# Patient Record
Sex: FEMALE | Race: OTHER | HISPANIC OR LATINO | URBAN - METROPOLITAN AREA
[De-identification: names, ages, dates, MRNs, and addresses within clinical notes are randomized per-mention and may not be internally consistent; named-entity substitution may affect disease eponyms.]

---

## 2022-01-27 ENCOUNTER — EMERGENCY (EMERGENCY)
Facility: HOSPITAL | Age: 57
LOS: 1 days | Discharge: HOME | End: 2022-01-27
Attending: STUDENT IN AN ORGANIZED HEALTH CARE EDUCATION/TRAINING PROGRAM
Payer: COMMERCIAL

## 2022-01-27 VITALS
HEART RATE: 77 BPM | OXYGEN SATURATION: 98 % | SYSTOLIC BLOOD PRESSURE: 126 MMHG | WEIGHT: 149.91 LBS | DIASTOLIC BLOOD PRESSURE: 82 MMHG | RESPIRATION RATE: 18 BRPM | TEMPERATURE: 98 F

## 2022-01-27 PROCEDURE — 12011 RPR F/E/E/N/L/M 2.5 CM/<: CPT

## 2022-01-27 PROCEDURE — 70450 CT HEAD/BRAIN W/O DYE: CPT | Mod: 26,MA

## 2022-01-27 PROCEDURE — 70450 CT HEAD/BRAIN W/O DYE: CPT | Mod: MA

## 2022-01-27 PROCEDURE — 70486 CT MAXILLOFACIAL W/O DYE: CPT | Mod: 26,MA

## 2022-01-27 PROCEDURE — 99284 EMERGENCY DEPT VISIT MOD MDM: CPT | Mod: 25

## 2022-01-27 PROCEDURE — 90471 IMMUNIZATION ADMIN: CPT | Mod: XU

## 2022-01-27 PROCEDURE — 90715 TDAP VACCINE 7 YRS/> IM: CPT

## 2022-01-27 PROCEDURE — 99285 EMERGENCY DEPT VISIT HI MDM: CPT | Mod: 25

## 2022-01-27 PROCEDURE — 70486 CT MAXILLOFACIAL W/O DYE: CPT | Mod: MA

## 2022-01-27 RX ORDER — TETANUS TOXOID, REDUCED DIPHTHERIA TOXOID AND ACELLULAR PERTUSSIS VACCINE, ADSORBED 5; 2.5; 8; 8; 2.5 [IU]/.5ML; [IU]/.5ML; UG/.5ML; UG/.5ML; UG/.5ML
0.5 SUSPENSION INTRAMUSCULAR ONCE
Refills: 0 | Status: COMPLETED | OUTPATIENT
Start: 2022-01-27 | End: 2022-01-27

## 2022-01-27 RX ADMIN — TETANUS TOXOID, REDUCED DIPHTHERIA TOXOID AND ACELLULAR PERTUSSIS VACCINE, ADSORBED 0.5 MILLILITER(S): 5; 2.5; 8; 8; 2.5 SUSPENSION INTRAMUSCULAR at 10:29
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
## 2022-01-27 NOTE — ED PROVIDER NOTE - PATIENT PORTAL LINK FT
You can access the FollowMyHealth Patient Portal offered by [facility] by registering at the following website: [email or web address]. By joining [facility]’s FollowMyHealth portal, you will also be able to view your health information using other applications (apps) compatible with our system.
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
## 2022-01-27 NOTE — ED PROVIDER NOTE - PHYSICAL EXAMINATION
GENERAL: patient appears well, no acute distress, appropriate, pleasant  EYES: sclera clear, no exudates  ENMT: oropharynx clear without erythema, no exudates, +laceration on bridge of nose  NECK: supple, soft, no thyromegaly noted, +in C-spine collar   LUNGS: good air entry bilaterally, clear to auscultation, symmetric breath sounds, no wheezing, rhonchi or rales appreciated  HEART: S1/S2, regular rate and rhythm, no murmurs noted, no lower extremity edema  GASTROINTESTINAL: abdomen is soft, nontender, nondistended, normoactive bowel sounds, no palpable masses  INTEGUMENT: good skin turgor, +Lesion on nose   MUSCULOSKELETAL: no clubbing or cyanosis, no obvious deformity,  NEUROLOGIC: AAO x3, good muscle tone in 4 extremities, no obvious sensory deficits  PSYCHIATRIC: mood is good, affect is congruent, linear and logical thought process

## 2022-01-27 NOTE — ED PROVIDER NOTE - NSFOLLOWUPINSTRUCTIONS_ED_ALL_ED_FT
Follow up with your PCP in 24-48 hours.   Your sutures will fall out on their own.   May take Tylenol and Motrin as directed on the bottle for pain control.   Return to the ER if you develop any new or worsening symptoms such as fever, redness/swelling, pus drainage, chest pain, shortness of breath, numbness, weakness, abdominal pain, nausea, vomiting, or visual changes.

## 2022-01-27 NOTE — ED ADULT NURSE NOTE - NSIMPLEMENTINTERV_GEN_ALL_ED
Implemented All Fall Risk Interventions:  [place] to call system. Call bell, personal items and telephone within reach. Instruct patient to call for assistance. Room bathroom lighting operational. Non-slip footwear when patient is off stretcher. Physically safe environment: no spills, clutter or unnecessary equipment. Stretcher in lowest position, wheels locked, appropriate side rails in place. Provide visual cue, wrist band, yellow gown, etc. Monitor gait and stability. Monitor for mental status changes and reorient to person, place, and time. Review medications for side effects contributing to fall risk. Reinforce activity limits and safety measures with patient and family.
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
## 2022-01-27 NOTE — ED ADULT NURSE NOTE - PAIN: BODY LOCATION
Detail Level: Simple Other (Free Text): samples of Retin A 0.08% was gave to patient to use daily for 3 months. May use vaseline if gets irritated. Note Text (......Xxx Chief Complaint.): This diagnosis correlates with the Other (Free Text): advised patient we are not doing elective procedures. gave a sample of retina to try to exfoliate off but not STD or cancerous. advised to rtc in 3 months if needed for treatment headache
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
## 2024-03-06 ENCOUNTER — RX ONLY (RX ONLY)
Age: 59
End: 2024-03-06

## 2024-03-06 ENCOUNTER — OFFICE (OUTPATIENT)
Dept: URBAN - METROPOLITAN AREA CLINIC 28 | Facility: CLINIC | Age: 59
Setting detail: OPHTHALMOLOGY
End: 2024-03-06
Payer: COMMERCIAL

## 2024-03-06 DIAGNOSIS — H25.13: ICD-10-CM

## 2024-03-06 DIAGNOSIS — H10.45: ICD-10-CM

## 2024-03-06 PROCEDURE — 92004 COMPRE OPH EXAM NEW PT 1/>: CPT | Performed by: OPHTHALMOLOGY

## 2024-03-06 ASSESSMENT — REFRACTION_CURRENTRX
OD_AXIS: 180
OD_SPHERE: +1.00
OS_AXIS: 180
OS_ADD: +2.50
OD_ADD: +2.50
OS_SPHERE: +1.00
OS_OVR_VA: 20/
OS_VPRISM_DIRECTION: PROGS
OS_CYLINDER: 0.00
OD_CYLINDER: 0.00
OD_VPRISM_DIRECTION: PROGS
OD_OVR_VA: 20/

## 2024-11-20 ENCOUNTER — OFFICE (OUTPATIENT)
Dept: URBAN - METROPOLITAN AREA CLINIC 28 | Facility: CLINIC | Age: 59
Setting detail: OPHTHALMOLOGY
End: 2024-11-20
Payer: COMMERCIAL

## 2024-11-20 DIAGNOSIS — H10.45: ICD-10-CM

## 2024-11-20 DIAGNOSIS — H25.13: ICD-10-CM

## 2024-11-20 PROCEDURE — 92012 INTRM OPH EXAM EST PATIENT: CPT | Performed by: OPHTHALMOLOGY

## 2024-11-20 ASSESSMENT — CONFRONTATIONAL VISUAL FIELD TEST (CVF)
OS_FINDINGS: FULL
OD_FINDINGS: FULL

## 2024-11-20 ASSESSMENT — REFRACTION_CURRENTRX
OS_SPHERE: +1.00
OD_VPRISM_DIRECTION: PROGS
OS_VPRISM_DIRECTION: PROGS
OS_OVR_VA: 20/
OD_CYLINDER: 0.00
OS_ADD: +2.50
OD_SPHERE: +1.00
OD_OVR_VA: 20/
OS_CYLINDER: 0.00
OD_ADD: +2.50
OD_AXIS: 180
OS_AXIS: 180

## 2024-11-20 ASSESSMENT — KERATOMETRY
OD_K1POWER_DIOPTERS: 43.25
OD_AXISANGLE_DEGREES: 088
OS_K2POWER_DIOPTERS: 44.00
OS_AXISANGLE_DEGREES: 089
OS_K1POWER_DIOPTERS: 43.00
OD_K2POWER_DIOPTERS: 45.00

## 2024-11-20 ASSESSMENT — REFRACTION_AUTOREFRACTION
OD_AXIS: 160
OS_CYLINDER: 0.00
OD_SPHERE: +1.50
OD_CYLINDER: -0.25
OS_SPHERE: +1.25
OS_AXIS: 000

## 2024-11-20 ASSESSMENT — VISUAL ACUITY
OS_BCVA: 20/20-1
OD_BCVA: 20/20

## 2024-11-20 ASSESSMENT — TONOMETRY: OS_IOP_MMHG: 15

## 2024-12-19 ENCOUNTER — OFFICE (OUTPATIENT)
Dept: URBAN - METROPOLITAN AREA CLINIC 28 | Facility: CLINIC | Age: 59
Setting detail: OPHTHALMOLOGY
End: 2024-12-19
Payer: COMMERCIAL

## 2024-12-19 DIAGNOSIS — H01.001: ICD-10-CM

## 2024-12-19 DIAGNOSIS — H10.45: ICD-10-CM

## 2024-12-19 DIAGNOSIS — H25.13: ICD-10-CM

## 2024-12-19 DIAGNOSIS — H01.004: ICD-10-CM

## 2024-12-19 PROCEDURE — 92012 INTRM OPH EXAM EST PATIENT: CPT | Performed by: OPHTHALMOLOGY

## 2024-12-19 ASSESSMENT — CONFRONTATIONAL VISUAL FIELD TEST (CVF)
OS_FINDINGS: FULL
OD_FINDINGS: FULL

## 2024-12-19 ASSESSMENT — REFRACTION_CURRENTRX
OD_AXIS: 180
OD_OVR_VA: 20/
OD_ADD: +2.50
OS_VPRISM_DIRECTION: PROGS
OD_VPRISM_DIRECTION: PROGS
OS_CYLINDER: 0.00
OS_SPHERE: +1.00
OS_ADD: +2.50
OS_OVR_VA: 20/
OD_SPHERE: +1.00
OS_AXIS: 180
OD_CYLINDER: 0.00

## 2024-12-19 ASSESSMENT — VISUAL ACUITY
OD_BCVA: 20/20
OS_BCVA: 20/20-1

## 2024-12-19 ASSESSMENT — REFRACTION_AUTOREFRACTION
OD_SPHERE: +1.50
OS_AXIS: 000
OS_CYLINDER: 0.00
OD_CYLINDER: -0.25
OD_AXIS: 140
OS_SPHERE: +1.50

## 2024-12-19 ASSESSMENT — KERATOMETRY
OD_K1POWER_DIOPTERS: 43.25
OS_K2POWER_DIOPTERS: 44.25
OS_AXISANGLE_DEGREES: 089
OD_K2POWER_DIOPTERS: 44.75
OS_K1POWER_DIOPTERS: 43.25
OD_AXISANGLE_DEGREES: 086

## 2024-12-19 ASSESSMENT — LID EXAM ASSESSMENTS
OS_BLEPHARITIS: LUL T 1+
OD_BLEPHARITIS: RUL T 1+

## 2024-12-19 ASSESSMENT — TONOMETRY: OS_IOP_MMHG: 12
